# Patient Record
Sex: FEMALE | Race: WHITE | NOT HISPANIC OR LATINO | Employment: OTHER | URBAN - METROPOLITAN AREA
[De-identification: names, ages, dates, MRNs, and addresses within clinical notes are randomized per-mention and may not be internally consistent; named-entity substitution may affect disease eponyms.]

---

## 2020-08-21 ENCOUNTER — APPOINTMENT (OUTPATIENT)
Dept: RADIOLOGY | Facility: IMAGING CENTER | Age: 70
End: 2020-08-21
Attending: PHYSICIAN ASSISTANT
Payer: MEDICARE

## 2020-08-21 ENCOUNTER — OFFICE VISIT (OUTPATIENT)
Dept: URGENT CARE | Facility: PHYSICIAN GROUP | Age: 70
End: 2020-08-21
Payer: MEDICARE

## 2020-08-21 VITALS
RESPIRATION RATE: 16 BRPM | BODY MASS INDEX: 39.07 KG/M2 | OXYGEN SATURATION: 95 % | SYSTOLIC BLOOD PRESSURE: 116 MMHG | HEIGHT: 60 IN | TEMPERATURE: 98.4 F | WEIGHT: 199 LBS | HEART RATE: 79 BPM | DIASTOLIC BLOOD PRESSURE: 72 MMHG

## 2020-08-21 DIAGNOSIS — S43.421A SPRAIN OF RIGHT ROTATOR CUFF CAPSULE, INITIAL ENCOUNTER: ICD-10-CM

## 2020-08-21 DIAGNOSIS — S43.421A SPRAIN OF RIGHT ROTATOR CUFF CAPSULE, INITIAL ENCOUNTER: Primary | ICD-10-CM

## 2020-08-21 PROBLEM — E78.49 OTHER HYPERLIPIDEMIA: Status: ACTIVE | Noted: 2018-07-10

## 2020-08-21 PROBLEM — E66.9 OBESITY (BMI 35.0-39.9 WITHOUT COMORBIDITY): Status: ACTIVE | Noted: 2018-07-10

## 2020-08-21 PROBLEM — E89.0 POSTOPERATIVE HYPOTHYROIDISM: Status: ACTIVE | Noted: 2017-02-15

## 2020-08-21 PROBLEM — F41.9 ANXIETY: Status: ACTIVE | Noted: 2018-07-10

## 2020-08-21 PROBLEM — R07.9 CHEST PAIN: Status: ACTIVE | Noted: 2018-07-10

## 2020-08-21 PROBLEM — F32.A DEPRESSION: Status: ACTIVE | Noted: 2018-07-10

## 2020-08-21 PROBLEM — Z85.850 HISTORY OF MALIGNANT NEOPLASM OF THYROID: Status: ACTIVE | Noted: 2017-02-15

## 2020-08-21 PROBLEM — M19.90 OA (OSTEOARTHRITIS): Status: ACTIVE | Noted: 2018-07-10

## 2020-08-21 PROCEDURE — 73030 X-RAY EXAM OF SHOULDER: CPT | Mod: TC,FY,RT | Performed by: PHYSICIAN ASSISTANT

## 2020-08-21 PROCEDURE — 99204 OFFICE O/P NEW MOD 45 MIN: CPT | Performed by: PHYSICIAN ASSISTANT

## 2020-08-21 RX ORDER — ALBUTEROL SULFATE 1.25 MG/3ML
400 SOLUTION RESPIRATORY (INHALATION)
COMMUNITY

## 2020-08-21 RX ORDER — METHYLPREDNISOLONE 4 MG/1
TABLET ORAL
Qty: 21 TAB | Refills: 0 | Status: SHIPPED | OUTPATIENT
Start: 2020-08-21

## 2020-08-21 RX ORDER — SERTRALINE HYDROCHLORIDE 100 MG/1
150 TABLET, FILM COATED ORAL DAILY
COMMUNITY

## 2020-08-21 RX ORDER — ATORVASTATIN CALCIUM 40 MG/1
TABLET, FILM COATED ORAL
COMMUNITY

## 2020-08-21 RX ORDER — BACLOFEN 10 MG/1
10 TABLET ORAL 3 TIMES DAILY
Qty: 30 TAB | Refills: 0 | Status: SHIPPED | OUTPATIENT
Start: 2020-08-21 | End: 2020-08-31

## 2020-08-21 RX ORDER — ALBUTEROL SULFATE 90 UG/1
AEROSOL, METERED RESPIRATORY (INHALATION)
COMMUNITY

## 2020-08-21 RX ORDER — LEVOTHYROXINE SODIUM 100 UG/1
100 CAPSULE ORAL
COMMUNITY

## 2020-08-21 RX ORDER — ASPIRIN 81 MG/1
81 TABLET, CHEWABLE ORAL DAILY
COMMUNITY

## 2020-08-21 RX ORDER — LANOLIN ALCOHOL/MO/W.PET/CERES
CREAM (GRAM) TOPICAL
COMMUNITY

## 2020-08-21 RX ORDER — LEVOTHYROXINE SODIUM 0.1 MG/1
TABLET ORAL
COMMUNITY

## 2020-08-21 RX ORDER — LORAZEPAM 2 MG/1
2 TABLET ORAL
COMMUNITY

## 2020-08-21 NOTE — PROGRESS NOTES
Subjective:      Pt is a 70 y.o. female who presents with Shoulder Injury (fell on tuesday)            HPI  This is a new problem. Pt notes getting on bus in [place], [place] to come visit here in [place] she tripped and fell and injured her right shoulder with pain and loss of ROM x 3 days. Pt is right hand dominant. Pt has not taken any Rx medications for this condition. Pt states the pain is a 5/10, aching in nature and worse at night. Pt denies CP, SOB, NVD, paresthesias, headaches, dizziness, change in vision, hives, or other joint pain. The pt's medication list, problem list, and allergies have been evaluated and reviewed during today's visit.    PMH:  Negative per pt.      PSH:  Negative per pt.      Fam Hx:  the patient's family history is not pertinent to their current complaint    Soc HX:  Social History     Socioeconomic History   • Marital status:      Spouse name: Not on file   • Number of children: Not on file   • Years of education: Not on file   • Highest education level: Not on file   Occupational History   • Not on file   Social Needs   • Financial resource strain: Not on file   • Food insecurity     Worry: Not on file     Inability: Not on file   • Transportation needs     Medical: Not on file     Non-medical: Not on file   Tobacco Use   • Smoking status: Never Smoker   • Smokeless tobacco: Never Used   Substance and Sexual Activity   • Alcohol use: Not Currently   • Drug use: Never   • Sexual activity: Not on file   Lifestyle   • Physical activity     Days per week: Not on file     Minutes per session: Not on file   • Stress: Not on file   Relationships   • Social connections     Talks on phone: Not on file     Gets together: Not on file     Attends Mandaeism service: Not on file     Active member of club or organization: Not on file     Attends meetings of clubs or organizations: Not on file     Relationship status: Not on file   • Intimate partner violence     Fear of current or ex partner: Not on  file     Emotionally abused: Not on file     Physically abused: Not on file     Forced sexual activity: Not on file   Other Topics Concern   • Not on file   Social History Narrative   • Not on file         Medications:    Current Outpatient Medications:   •  Levothyroxine Sodium 100 MCG Cap, Take 100 mcg by mouth., Disp: , Rfl:   •  LORazepam (ATIVAN) 2 MG tablet, Take 2 mg by mouth., Disp: , Rfl:   •  sertraline (ZOLOFT) 100 MG Tab, Take 150 mg by mouth every day., Disp: , Rfl:   •  albuterol (ACCUNEB) 1.25 MG/3ML nebulizer solution, Inhale 400 mcg/kg by mouth., Disp: , Rfl:   •  albuterol (VENTOLIN HFA) 108 (90 Base) MCG/ACT Aero Soln inhalation aerosol, inhale 2 puff by inhalation route  every 4 - 6 hours as needed, Disp: , Rfl:   •  aspirin (ASA) 81 MG Chew Tab chewable tablet, Take 81 mg by mouth every day., Disp: , Rfl:   •  atorvastatin (LIPITOR) 40 MG Tab, atorvastatin 40 mg tablet  QD, Disp: , Rfl:   •  Cyanocobalamin (VITAMIN B-12) 1000 MCG Tab, , Disp: , Rfl:   •  levothyroxine (SYNTHROID) 100 MCG Tab, levothyroxine 100 mcg tablet, Disp: , Rfl:   •  carbidopa-levodopa (SINEMET)  MG Tab, carbidopa 25 mg-levodopa 100 mg tablet  3 QD, Disp: , Rfl:       Allergies:  Haldol  [haloperidol]    ROS    Constitutional: Negative for fever, chills and malaise/fatigue.   HENT: Negative for congestion and sore throat.    Eyes: Negative for blurred vision, double vision and photophobia.   Respiratory: Negative for cough and shortness of breath.  Cardiovascular: Negative for chest pain and palpitations.   Gastrointestinal: Negative for heartburn, nausea, vomiting, abdominal pain, diarrhea and constipation.   Genitourinary: Negative for dysuria and flank pain.   Musculoskeletal: POS for right shoulder joint pain and myalgias.   Skin: Negative for itching and rash.   Neurological: Negative for dizziness, tingling and headaches.   Endo/Heme/Allergies: Does not bruise/bleed easily.   Psychiatric/Behavioral: Negative  for depression. The patient is not nervous/anxious.         Objective:     /72   Pulse 79   Temp 36.9 °C (98.4 °F) (Temporal)   Resp 16   Ht 1.524 m (5')   Wt 90.3 kg (199 lb)   SpO2 95%   BMI 38.86 kg/m²      Physical Exam  Musculoskeletal:      Right shoulder: She exhibits decreased range of motion, tenderness, bony tenderness, swelling, pain and decreased strength. She exhibits no effusion, no crepitus, no deformity, no laceration, no spasm and normal pulse.        Arms:            Constitutional: PT is oriented to person, place, and time. PT appears well-developed and well-nourished. No distress.   HENT:   Head: Normocephalic and atraumatic.   Mouth/Throat: Oropharynx is clear and moist. No oropharyngeal exudate.   Eyes: Conjunctivae normal and EOM are normal. Pupils are equal, round, and reactive to light.   Neck: Normal range of motion. Neck supple. No thyromegaly present.   Cardiovascular: Normal rate, regular rhythm, normal heart sounds and intact distal pulses.  Exam reveals no gallop and no friction rub.    No murmur heard.  Pulmonary/Chest: Effort normal and breath sounds normal. No respiratory distress. PT has no wheezes. PT has no rales. Pt exhibits no tenderness.   Abdominal: Soft. Bowel sounds are normal. PT exhibits no distension and no mass. There is no tenderness. There is no rebound and no guarding.   Neurological: PT is alert and oriented to person, place, and time. PT has normal reflexes. No cranial nerve deficit.   Skin: Skin is warm and dry. No rash noted. PT is not diaphoretic. No erythema.       Psychiatric: PT has a normal mood and affect. PT behavior is normal. Judgment and thought content normal.     RADS:    DX-SHOULDER 2+ RIGHT  Order: [id number]  Status:  Final result   Visible to patient:  No (not released)   Next appt:  None   Dx:  Sprain of right rotator cuff capsule,...  Details    Reading Physician Reading Date Result Priority   Michelle Kimble M.D.  [phone]  8/21/2020 [facility]      Narrative & Impression        8/21/2020 2:08 PM     HISTORY/REASON FOR EXAM:  Pain/Deformity Following Trauma        TECHNIQUE/EXAM DESCRIPTION AND NUMBER OF VIEWS:  3 views of the RIGHT shoulder.     COMPARISON: None     FINDINGS:  There is no evidence of fracture or dislocation. Glenohumeral and acromioclavicular articulation is maintained.  Right lung field is clear.        IMPRESSION:     No evidence of acute fracture or dislocation.            Last Resulted: 08/21/20  2:32 PM                    Assessment/Plan:        1. Sprain of right rotator cuff capsule, initial encounter    - DX-SHOULDER 2+ RIGHT; Future    Pt to follow up with PCP in [place] in 6 days  Baclofen  Medrol  RICE therapy discussed  Gentle ROM exercises discussed  WBAT RUE  Ice/heat therapy discussed  OTC ibuprofen for pain control  Rest, fluids encouraged.  AVS with medical info given.  Pt was in full understanding and agreement with the plan.  Follow-up as needed if symptoms worsen or fail to improve.